# Patient Record
Sex: FEMALE | Race: WHITE | NOT HISPANIC OR LATINO | Employment: PART TIME | URBAN - METROPOLITAN AREA
[De-identification: names, ages, dates, MRNs, and addresses within clinical notes are randomized per-mention and may not be internally consistent; named-entity substitution may affect disease eponyms.]

---

## 2017-09-07 ENCOUNTER — HOSPITAL ENCOUNTER (EMERGENCY)
Facility: OTHER | Age: 57
Discharge: HOME | End: 2017-09-07
Attending: EMERGENCY MEDICINE
Payer: MEDICAID

## 2017-09-07 VITALS
DIASTOLIC BLOOD PRESSURE: 83 MMHG | TEMPERATURE: 98 F | HEART RATE: 85 BPM | BODY MASS INDEX: 37.73 KG/M2 | OXYGEN SATURATION: 95 % | SYSTOLIC BLOOD PRESSURE: 152 MMHG | RESPIRATION RATE: 20 BRPM | HEIGHT: 62 IN | WEIGHT: 205 LBS

## 2017-09-07 DIAGNOSIS — J45.901 ASTHMA ATTACK: Primary | ICD-10-CM

## 2017-09-07 DIAGNOSIS — Z72.0 TOBACCO ABUSE: ICD-10-CM

## 2017-09-07 LAB
CTP QC/QA: YES
CTP QC/QA: YES
FLUAV AG NPH QL: NEGATIVE
FLUBV AG NPH QL: NEGATIVE
S PYO RRNA THROAT QL PROBE: NEGATIVE

## 2017-09-07 PROCEDURE — 87804 INFLUENZA ASSAY W/OPTIC: CPT

## 2017-09-07 PROCEDURE — 94644 CONT INHLJ TX 1ST HOUR: CPT

## 2017-09-07 PROCEDURE — 87880 STREP A ASSAY W/OPTIC: CPT

## 2017-09-07 PROCEDURE — 27100107 HC POCKET PEAK FLOW METER

## 2017-09-07 PROCEDURE — 99284 EMERGENCY DEPT VISIT MOD MDM: CPT | Mod: 25

## 2017-09-07 PROCEDURE — 25000003 PHARM REV CODE 250: Performed by: EMERGENCY MEDICINE

## 2017-09-07 PROCEDURE — 25000242 PHARM REV CODE 250 ALT 637 W/ HCPCS: Performed by: EMERGENCY MEDICINE

## 2017-09-07 PROCEDURE — 96372 THER/PROPH/DIAG INJ SC/IM: CPT

## 2017-09-07 PROCEDURE — 99900035 HC TECH TIME PER 15 MIN (STAT)

## 2017-09-07 PROCEDURE — 94640 AIRWAY INHALATION TREATMENT: CPT

## 2017-09-07 PROCEDURE — 63600175 PHARM REV CODE 636 W HCPCS: Performed by: EMERGENCY MEDICINE

## 2017-09-07 RX ORDER — METHYLPREDNISOLONE SOD SUCC 125 MG
125 VIAL (EA) INJECTION
Status: COMPLETED | OUTPATIENT
Start: 2017-09-07 | End: 2017-09-07

## 2017-09-07 RX ORDER — FLUTICASONE PROPIONATE 50 MCG
2 SPRAY, SUSPENSION (ML) NASAL 2 TIMES DAILY PRN
Qty: 15 G | Refills: 0 | Status: SHIPPED | OUTPATIENT
Start: 2017-09-07 | End: 2017-09-28

## 2017-09-07 RX ORDER — PREDNISONE 20 MG/1
40 TABLET ORAL DAILY
Qty: 10 TABLET | Refills: 0 | Status: SHIPPED | OUTPATIENT
Start: 2017-09-07 | End: 2017-09-12

## 2017-09-07 RX ORDER — IBUPROFEN 600 MG/1
600 TABLET ORAL
Status: COMPLETED | OUTPATIENT
Start: 2017-09-07 | End: 2017-09-07

## 2017-09-07 RX ORDER — AZITHROMYCIN 250 MG/1
TABLET, FILM COATED ORAL
Qty: 6 TABLET | Refills: 0 | Status: SHIPPED | OUTPATIENT
Start: 2017-09-07 | End: 2017-09-12

## 2017-09-07 RX ORDER — IPRATROPIUM BROMIDE AND ALBUTEROL SULFATE 2.5; .5 MG/3ML; MG/3ML
3 SOLUTION RESPIRATORY (INHALATION) ONCE
Status: COMPLETED | OUTPATIENT
Start: 2017-09-07 | End: 2017-09-07

## 2017-09-07 RX ORDER — IBUPROFEN 600 MG/1
600 TABLET ORAL EVERY 6 HOURS PRN
Qty: 20 TABLET | Refills: 0 | Status: SHIPPED | OUTPATIENT
Start: 2017-09-07 | End: 2017-12-24 | Stop reason: ALTCHOICE

## 2017-09-07 RX ORDER — LISINOPRIL 40 MG/1
40 TABLET ORAL DAILY
COMMUNITY

## 2017-09-07 RX ORDER — IPRATROPIUM BROMIDE 0.5 MG/2.5ML
500 SOLUTION RESPIRATORY (INHALATION)
Status: COMPLETED | OUTPATIENT
Start: 2017-09-07 | End: 2017-09-07

## 2017-09-07 RX ORDER — ALBUTEROL SULFATE 90 UG/1
1-2 AEROSOL, METERED RESPIRATORY (INHALATION) EVERY 6 HOURS PRN
Qty: 1 INHALER | Refills: 0 | Status: SHIPPED | OUTPATIENT
Start: 2017-09-07 | End: 2018-09-07

## 2017-09-07 RX ORDER — ALBUTEROL SULFATE 0.83 MG/ML
10 SOLUTION RESPIRATORY (INHALATION)
Status: COMPLETED | OUTPATIENT
Start: 2017-09-07 | End: 2017-09-07

## 2017-09-07 RX ADMIN — IPRATROPIUM BROMIDE AND ALBUTEROL SULFATE 3 ML: .5; 3 SOLUTION RESPIRATORY (INHALATION) at 12:09

## 2017-09-07 RX ADMIN — ALBUTEROL SULFATE 10 MG: 2.5 SOLUTION RESPIRATORY (INHALATION) at 01:09

## 2017-09-07 RX ADMIN — IPRATROPIUM BROMIDE 500 MCG: 0.5 SOLUTION RESPIRATORY (INHALATION) at 01:09

## 2017-09-07 RX ADMIN — METHYLPREDNISOLONE SODIUM SUCCINATE 125 MG: 125 INJECTION, POWDER, FOR SOLUTION INTRAMUSCULAR; INTRAVENOUS at 01:09

## 2017-09-07 RX ADMIN — IBUPROFEN 600 MG: 600 TABLET, FILM COATED ORAL at 12:09

## 2017-09-07 NOTE — ED PROVIDER NOTES
Encounter Date: 9/7/2017       History     Chief Complaint   Patient presents with    Sore Throat     patient reports having URI symptoms X2 days    Fever    Cough     Antionette Hess is a 56 y.o. female who presents to the Emergency Department with  runny nose, cough, congestion, and wheezing.  Patient states she started having sore throat, fever and cough 2 days ago.  Patient had a fever last night which she checked of 102.1.  Patient woke up wheezing this morning.  Patient does report a history of asthma.  Patient states she hasn't had an asthma attack a long time but just moved back to [place].  Patient also smokes cigarettes.  Patient states she smokes half pack a day.      The history is provided by the patient.   Cough   This is a new problem. The current episode started yesterday. The problem occurs every few minutes. The problem has been gradually worsening. The cough is productive of sputum. The maximum temperature recorded prior to her arrival was 102 - 102.9 F. The fever has been present for less than 1 day. Associated symptoms include chills, rhinorrhea, sore throat, myalgias and wheezing. Pertinent negatives include no chest pain and no shortness of breath. She has tried nothing for the symptoms. The treatment provided no relief. Her past medical history is significant for asthma.     Review of patient's allergies indicates:   Allergen Reactions    Iodine and iodide containing products      Past Medical History:   Diagnosis Date    Asthma     Hypertension      History reviewed. No pertinent surgical history.  History reviewed. No pertinent family history.  Social History   Substance Use Topics    Smoking status: Current Every Day Smoker     Packs/day: 0.50    Smokeless tobacco: Never Used    Alcohol use No     Review of Systems   Constitutional: Positive for chills. Negative for fever.   HENT: Positive for congestion, rhinorrhea and sore throat.    Respiratory: Positive for cough and  wheezing. Negative for shortness of breath.    Cardiovascular: Negative for chest pain.   Gastrointestinal: Negative for nausea.   Genitourinary: Negative for dysuria.   Musculoskeletal: Positive for myalgias. Negative for back pain.   Skin: Negative for rash.   Neurological: Negative for weakness.   Hematological: Does not bruise/bleed easily.   All other systems reviewed and are negative.      Physical Exam     Initial Vitals [09/07/17 1151]   BP Pulse Resp Temp SpO2   (!) 180/89 86 20 97.5 °F (36.4 °C) 97 %      MAP       119.33         Physical Exam    Nursing note and vitals reviewed.  Constitutional: She appears well-developed and well-nourished. She is not diaphoretic. No distress.   HENT:   Head: Normocephalic and atraumatic.   Right Ear: Hearing, tympanic membrane and external ear normal.   Left Ear: Hearing, tympanic membrane and external ear normal.   Nose: Mucosal edema and rhinorrhea present. Right sinus exhibits no maxillary sinus tenderness and no frontal sinus tenderness. Left sinus exhibits no maxillary sinus tenderness and no frontal sinus tenderness.   Mouth/Throat: Uvula is midline and mucous membranes are normal. Posterior oropharyngeal erythema present. No oropharyngeal exudate or posterior oropharyngeal edema.   Eyes: Conjunctivae and EOM are normal. Pupils are equal, round, and reactive to light. Right eye exhibits no discharge. Left eye exhibits no discharge.   Neck: Normal range of motion. Neck supple. No JVD present.   Cardiovascular: Normal rate, regular rhythm, normal heart sounds and intact distal pulses. Exam reveals no gallop and no friction rub.    No murmur heard.  Pulmonary/Chest: No respiratory distress. She has wheezes. She has no rhonchi. She has no rales. She exhibits no tenderness.   Abdominal: Soft. Bowel sounds are normal. She exhibits no distension. There is no tenderness. There is no rebound.   Musculoskeletal: Normal range of motion. She exhibits no edema or tenderness.    Lymphadenopathy:     She has no cervical adenopathy.   Neurological: She is alert and oriented to person, place, and time. She has normal strength and normal reflexes. She displays normal reflexes. No cranial nerve deficit or sensory deficit.   Skin: Skin is warm and dry. No rash and no abscess noted. No erythema. No pallor.   Psychiatric: She has a normal mood and affect.         ED Course   Procedures  Labs Reviewed   POCT RAPID STREP A   POCT INFLUENZA A/B                               ED Course        Medical decision making   Chief complaint: Nose, cough, sore throat, fever, and wheezing  Differential diagnosis: And diverticulitis, streptococcal pharyngitis, Influenza, viral illness, URI, asthma exacerbation, bronchitis, pneumonia  Treatment in the ED Physical Exam, ibuprofen for pain, DuoNeb's for wheezing.  Patient reports feeling better after medication.    Discussed labs, and imaging results.    Fill and take prescriptions as directed.  Return to the ED if symptoms worsen or do not resolve.   Answered questions and discussed discharge plan.    Patient feels much better and is ready for discharge.  Follow up with PCP in 1 days.    Clinical Impression:   The primary encounter diagnosis was Asthma attack. A diagnosis of Tobacco abuse was also pertinent to this visit.                           Heydi Joyce DO  09/07/17 7490

## 2017-09-07 NOTE — ED NOTES
Pt does not sound any better, still with audible wheezing, reports she doesn't feel any better, Dr Joyce notified, orders for hour long RT and steroids given

## 2017-09-19 ENCOUNTER — HOSPITAL ENCOUNTER (EMERGENCY)
Facility: OTHER | Age: 57
Discharge: HOME | End: 2017-09-19
Attending: EMERGENCY MEDICINE
Payer: MEDICAID

## 2017-09-19 VITALS
OXYGEN SATURATION: 100 % | HEART RATE: 67 BPM | DIASTOLIC BLOOD PRESSURE: 87 MMHG | BODY MASS INDEX: 38.09 KG/M2 | SYSTOLIC BLOOD PRESSURE: 132 MMHG | RESPIRATION RATE: 18 BRPM | WEIGHT: 207 LBS | HEIGHT: 62 IN | TEMPERATURE: 98 F

## 2017-09-19 DIAGNOSIS — G44.209 ACUTE NON INTRACTABLE TENSION-TYPE HEADACHE: Primary | ICD-10-CM

## 2017-09-19 LAB — POCT GLUCOSE: 82 MG/DL (ref 70–110)

## 2017-09-19 PROCEDURE — 99284 EMERGENCY DEPT VISIT MOD MDM: CPT | Mod: 25

## 2017-09-19 PROCEDURE — 63600175 PHARM REV CODE 636 W HCPCS: Performed by: EMERGENCY MEDICINE

## 2017-09-19 PROCEDURE — 96372 THER/PROPH/DIAG INJ SC/IM: CPT

## 2017-09-19 RX ORDER — BUTALBITAL, ACETAMINOPHEN AND CAFFEINE 50; 325; 40 MG/1; MG/1; MG/1
1 TABLET ORAL EVERY 4 HOURS PRN
Qty: 12 TABLET | Refills: 0 | Status: SHIPPED | OUTPATIENT
Start: 2017-09-19 | End: 2017-10-19

## 2017-09-19 RX ORDER — KETOROLAC TROMETHAMINE 30 MG/ML
60 INJECTION, SOLUTION INTRAMUSCULAR; INTRAVENOUS
Status: COMPLETED | OUTPATIENT
Start: 2017-09-19 | End: 2017-09-19

## 2017-09-19 RX ORDER — DIPHENHYDRAMINE HYDROCHLORIDE 50 MG/ML
50 INJECTION INTRAMUSCULAR; INTRAVENOUS
Status: COMPLETED | OUTPATIENT
Start: 2017-09-19 | End: 2017-09-19

## 2017-09-19 RX ORDER — PROCHLORPERAZINE EDISYLATE 5 MG/ML
10 INJECTION INTRAMUSCULAR; INTRAVENOUS
Status: COMPLETED | OUTPATIENT
Start: 2017-09-19 | End: 2017-09-19

## 2017-09-19 RX ADMIN — PROCHLORPERAZINE EDISYLATE 10 MG: 5 INJECTION INTRAMUSCULAR; INTRAVENOUS at 06:09

## 2017-09-19 RX ADMIN — DIPHENHYDRAMINE HYDROCHLORIDE 50 MG: 50 INJECTION, SOLUTION INTRAMUSCULAR; INTRAVENOUS at 06:09

## 2017-09-19 RX ADMIN — KETOROLAC TROMETHAMINE 60 MG: 60 INJECTION, SOLUTION INTRAMUSCULAR at 06:09

## 2017-12-24 ENCOUNTER — HOSPITAL ENCOUNTER (EMERGENCY)
Facility: OTHER | Age: 57
Discharge: HOME | End: 2017-12-25
Attending: EMERGENCY MEDICINE
Payer: MEDICAID

## 2017-12-24 VITALS
HEART RATE: 62 BPM | RESPIRATION RATE: 16 BRPM | TEMPERATURE: 99 F | OXYGEN SATURATION: 98 % | SYSTOLIC BLOOD PRESSURE: 144 MMHG | BODY MASS INDEX: 40.24 KG/M2 | WEIGHT: 220 LBS | DIASTOLIC BLOOD PRESSURE: 57 MMHG

## 2017-12-24 DIAGNOSIS — M25.511 ACUTE PAIN OF RIGHT SHOULDER: Primary | ICD-10-CM

## 2017-12-24 PROCEDURE — 99283 EMERGENCY DEPT VISIT LOW MDM: CPT | Mod: 25

## 2017-12-24 PROCEDURE — 96372 THER/PROPH/DIAG INJ SC/IM: CPT

## 2017-12-24 RX ORDER — KETOROLAC TROMETHAMINE 30 MG/ML
60 INJECTION, SOLUTION INTRAMUSCULAR; INTRAVENOUS
Status: COMPLETED | OUTPATIENT
Start: 2017-12-25 | End: 2017-12-25

## 2017-12-24 RX ORDER — IBUPROFEN 800 MG/1
800 TABLET ORAL EVERY 6 HOURS PRN
Qty: 20 TABLET | Refills: 0 | Status: SHIPPED | OUTPATIENT
Start: 2017-12-24 | End: 2018-07-04 | Stop reason: ALTCHOICE

## 2017-12-24 RX ORDER — DEXAMETHASONE SODIUM PHOSPHATE 4 MG/ML
8 INJECTION, SOLUTION INTRA-ARTICULAR; INTRALESIONAL; INTRAMUSCULAR; INTRAVENOUS; SOFT TISSUE
Status: COMPLETED | OUTPATIENT
Start: 2017-12-25 | End: 2017-12-25

## 2017-12-24 RX ORDER — DICLOFENAC SODIUM 10 MG/G
2 GEL TOPICAL 3 TIMES DAILY
Qty: 100 G | Refills: 0 | OUTPATIENT
Start: 2017-12-24 | End: 2020-10-31

## 2017-12-25 PROCEDURE — 63600175 PHARM REV CODE 636 W HCPCS: Performed by: EMERGENCY MEDICINE

## 2017-12-25 RX ADMIN — DEXAMETHASONE SODIUM PHOSPHATE 8 MG: 4 INJECTION, SOLUTION INTRAMUSCULAR; INTRAVENOUS at 12:12

## 2017-12-25 RX ADMIN — KETOROLAC TROMETHAMINE 60 MG: 60 INJECTION, SOLUTION INTRAMUSCULAR at 12:12

## 2017-12-25 NOTE — ED PROVIDER NOTES
Encounter Date: 12/24/2017       History     Chief Complaint   Patient presents with    Arm Injury     reports pain to her rt arm for more than a week, states she cannot lift her arm up, denies trauma      Denies traumatic injury.          Arm Injury    The incident occurred two days ago. 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 The injury mechanism is unknown. She came to the ER via by private vehicle. There is an injury to the right shoulder. There have been no prior injuries to these areas. She is right-handed. Her tetanus status is UTD. Pertinent negatives include no chest pain, no numbness, no nausea, no vomiting, no inability to bear weight, no neck pain, no pain when bearing weight, no focal weakness, no decreased responsiveness, no loss of consciousness, no tingling and no weakness.     Review of patient's allergies indicates:   Allergen Reactions    Iodine and iodide containing products      Past Medical History:   Diagnosis Date    Asthma     Hypertension      No past surgical history on file.  No family history on file.  Social History   Substance Use Topics    Smoking status: Current Every Day Smoker     Packs/day: 0.50    Smokeless tobacco: Never Used    Alcohol use No     Review of Systems   Constitutional: Negative for decreased responsiveness and fever.   Respiratory: Negative for shortness of breath.    Cardiovascular: Negative for chest pain.   Gastrointestinal: Negative for nausea and vomiting.   Musculoskeletal: Positive for arthralgias. Negative for back pain, gait problem, joint swelling, myalgias, neck pain and neck stiffness.   Neurological: Negative for tingling, focal weakness, loss of consciousness, weakness and numbness.   All other systems reviewed and are negative.      Physical Exam     Initial Vitals [12/24/17 2205]   BP Pulse Resp Temp SpO2   (!) 144/57 62 16 98.6 °F (37 °C) 98 %      MAP       86         Physical Exam    Nursing note and vitals  reviewed.  Constitutional: She appears well-developed and well-nourished. She is not diaphoretic. No distress.   HENT:   Head: Normocephalic and atraumatic.   Eyes: Conjunctivae are normal. Pupils are equal, round, and reactive to light.   Neck: Normal range of motion. Neck supple.   Cardiovascular: Normal rate and intact distal pulses.   Pulmonary/Chest: No accessory muscle usage. No tachypnea. No respiratory distress.   Abdominal: She exhibits no distension. There is no tenderness.   Musculoskeletal: Normal range of motion.        Right shoulder: She exhibits tenderness. She exhibits normal range of motion (ROM reproduces pain greater than 60 degrees of flexion), no bony tenderness, no swelling, no effusion, no crepitus, no deformity, no laceration, no spasm, normal pulse and normal strength.        Right elbow: Normal.       Right wrist: Normal.        Cervical back: Normal.   Neurological: She is alert and oriented to person, place, and time.   Skin: Skin is warm. Capillary refill takes less than 2 seconds.   Psychiatric: She has a normal mood and affect.         ED Course   Procedures  Labs Reviewed - No data to display          Medical Decision Making:   Differential Diagnosis:   Sprain, spasm, arthritis, others                   ED Course      Discharge Medications     Medication List with Changes/Refills   New Medications    DICLOFENAC SODIUM (VOLTAREN) 1 % GEL    Apply 2 g topically 3 (three) times daily.    IBUPROFEN (ADVIL,MOTRIN) 800 MG TABLET    Take 1 tablet (800 mg total) by mouth every 6 (six) hours as needed for Pain.   Current Medications    ALBUTEROL 90 MCG/ACTUATION INHALER    Inhale 1-2 puffs into the lungs every 6 (six) hours as needed for Wheezing. Rescue    CETIRIZINE (ZYRTEC) 10 MG TABLET    Take 1 tablet (10 mg total) by mouth once daily. May buy Over the Counter.    LISINOPRIL (PRINIVIL,ZESTRIL) 40 MG TABLET    Take 40 mg by mouth once daily.   Discontinued Medications    IBUPROFEN  (ADVIL,MOTRIN) 600 MG TABLET    Take 1 tablet (600 mg total) by mouth every 6 (six) hours as needed for Pain or Temperature greater than (100.4).             Patient discharged to home in stable condition with instructions to:   1. Please take all meds as prescribed.  2. Follow-up with your primary care doctor   3. Return precautions discussed and patient and/or family/caretaker understands to return to the emergency room for any concerns including worsening of your current symptoms, fever, chills, night sweats, worsening pain, chest pain, shortness of breath, nausea, vomiting, diarrhea, bleeding, headache, difficulty talking, visual disturbances, weakness, numbness or any other acute concerns    Clinical Impression:   The encounter diagnosis was Acute pain of right shoulder.                           Gerard Adams MD  01/22/18 0614

## 2017-12-25 NOTE — ED NOTES
Pt here with c/o right arm pain x 1 week. Pt denies injury, states that it hurts to use her right arm, unable to raise above shoulder height. Pt states that when she tries to move, it feels like something is pulling at her right shoulder. Pt alert and oriented, no meds pta. States she tried an icy hot patch with no relief. Resps reg and unlabored, skin warm and dry, no distress noted.

## 2017-12-25 NOTE — ED NOTES
Sling applied to pt's right arm, and instructions for wearing sling provided. Pt verbalizes understanding

## 2018-07-03 ENCOUNTER — HOSPITAL ENCOUNTER (EMERGENCY)
Facility: HOSPITAL | Age: 58
Discharge: HOME | End: 2018-07-04
Attending: EMERGENCY MEDICINE
Payer: MEDICAID

## 2018-07-03 VITALS
DIASTOLIC BLOOD PRESSURE: 73 MMHG | SYSTOLIC BLOOD PRESSURE: 145 MMHG | HEART RATE: 73 BPM | OXYGEN SATURATION: 99 % | TEMPERATURE: 98 F | RESPIRATION RATE: 18 BRPM | BODY MASS INDEX: 35.88 KG/M2 | WEIGHT: 195 LBS | HEIGHT: 62 IN

## 2018-07-03 DIAGNOSIS — S60.00XA CONTUSION OF FINGER OF RIGHT HAND, UNSPECIFIED FINGER, INITIAL ENCOUNTER: Primary | ICD-10-CM

## 2018-07-03 DIAGNOSIS — H60.12 CELLULITIS OF EXTERNAL EAR, LEFT: ICD-10-CM

## 2018-07-03 PROCEDURE — 29130 APPL FINGER SPLINT STATIC: CPT | Mod: F8

## 2018-07-03 PROCEDURE — 99283 EMERGENCY DEPT VISIT LOW MDM: CPT | Mod: 25

## 2018-07-04 RX ORDER — IBUPROFEN 800 MG/1
800 TABLET ORAL EVERY 6 HOURS PRN
Qty: 20 TABLET | Refills: 0 | OUTPATIENT
Start: 2018-07-04 | End: 2019-08-15

## 2018-07-04 RX ORDER — MUPIROCIN 20 MG/G
OINTMENT TOPICAL 2 TIMES DAILY
Qty: 22 G | Refills: 0 | Status: SHIPPED | OUTPATIENT
Start: 2018-07-04 | End: 2018-07-14

## 2018-07-04 RX ORDER — SULFAMETHOXAZOLE AND TRIMETHOPRIM 800; 160 MG/1; MG/1
1 TABLET ORAL 2 TIMES DAILY
Qty: 14 TABLET | Refills: 0 | Status: SHIPPED | OUTPATIENT
Start: 2018-07-04 | End: 2018-07-11

## 2018-07-04 NOTE — ED PROVIDER NOTES
Encounter Date: 7/3/2018    SCRIBE #1 NOTE: I, Vahid Bonds, am scribing for, and in the presence of,  Dr. Adams. I have scribed the entire note.       History     Chief Complaint   Patient presents with    Otalgia     pt presents with c/o L, ear pain radiating to L, side of face; (+) swelling, warmth and redness noted to L, ear; denies drainage    Finger Injury     pt presents with c/o injury to R, 4th finger; pt reports smashing finger in a drawer recently; swelling noted to to finger; reports limited ROM     The patient is a 57 y.o. female who presents to the ED with a complaint of swelling and tenderness of the left ear with pain that radiates throughout her left side of the face down and her neck .   Patient states that she went to work for 10 hours and her boss noticed the swelling of her ear.  She reports secondary complaint of acute finger injury after pinching the skin of her finger in a drawer 1 day ago.  After she went to bed she woke up with swelling to her right hand that has gotten worse after working all day.         The history is provided by the patient.     Review of patient's allergies indicates:   Allergen Reactions    Iodine and iodide containing products      Past Medical History:   Diagnosis Date    Asthma     Hypertension      No past surgical history on file.  No family history on file.  Social History   Substance Use Topics    Smoking status: Current Every Day Smoker     Packs/day: 0.50    Smokeless tobacco: Never Used    Alcohol use No     Review of Systems   Constitutional: Negative for fever.   HENT: Positive for ear pain (Left ear pain. ). Negative for ear discharge, facial swelling and trouble swallowing.         Negative tongue swelling. Swelling and tenderness of the left ear.    Respiratory: Negative for shortness of breath.    Gastrointestinal: Negative for nausea and vomiting.   Musculoskeletal: Negative for neck pain.   Skin: Positive for wound (Right hand).   All other  systems reviewed and are negative.      Physical Exam     Initial Vitals [07/03/18 2239]   BP Pulse Resp Temp SpO2   (!) 145/73 73 18 98.2 °F (36.8 °C) 99 %      MAP       --         Physical Exam    Nursing note and vitals reviewed.  Constitutional: She appears well-developed and well-nourished.   HENT:   Head: Normocephalic and atraumatic.   Swelling, erythema, and edema to the left external ear.     Neck: Normal range of motion. Neck supple.   Musculoskeletal: Normal range of motion.        Hands:  Proximal Phalanx swelling.     Neurological: She is alert and oriented to person, place, and time.   Sensation intact.      Skin: Skin is warm, dry and intact. Capillary refill takes less than 2 seconds.   Psychiatric: She has a normal mood and affect.         ED Course   Procedures  Labs Reviewed - No data to display       Imaging Results    None                     Scribe Attestation:   Scribe #1: I performed the above scribed service and the documentation accurately describes the services I performed. I attest to the accuracy of the note.         This document was produced by a scribe under my direction and in my presence. I agree with the content of the note and have made any necessary edits.     Gerard Adams MD         Note was created using voice recognition software. Note may have occasional typographical errors that may not have been identified and edited despite good xander initial review prior to signing.           Discharge Medications     Discharge Medication List as of 7/4/2018 12:27 AM      START taking these medications    Details   ibuprofen (ADVIL,MOTRIN) 800 MG tablet Take 1 tablet (800 mg total) by mouth every 6 (six) hours as needed for Pain., Starting Wed 7/4/2018, Print      mupirocin (BACTROBAN) 2 % ointment Apply topically 2 (two) times daily. for 10 days, Starting Wed 7/4/2018, Until Sat 7/14/2018, Print      sulfamethoxazole-trimethoprim 800-160mg (BACTRIM DS) 800-160 mg Tab Take 1 tablet by  mouth 2 (two) times daily. for 7 days, Starting Wed 7/4/2018, Until Wed 7/11/2018, Print         CONTINUE these medications which have NOT CHANGED    Details   albuterol 90 mcg/actuation inhaler Inhale 1-2 puffs into the lungs every 6 (six) hours as needed for Wheezing. Rescue, Starting Thu 9/7/2017, Until Fri 9/7/2018, Print      cetirizine (ZYRTEC) 10 MG tablet Take 1 tablet (10 mg total) by mouth once daily. May buy Over the Counter., Starting Mon 12/23/2013, Until Tue 9/19/2017, Print      diclofenac sodium (VOLTAREN) 1 % Gel Apply 2 g topically 3 (three) times daily., Starting Sun 12/24/2017, Print      lisinopril (PRINIVIL,ZESTRIL) 40 MG tablet Take 40 mg by mouth once daily., Historical Med                   Patient discharged to home in stable condition with instructions to:   1. Please take all meds as prescribed.  2. Follow-up with your primary care doctor   3. Return precautions discussed and patient and/or family/caretaker understands to return to the emergency room for any concerns including worsening of your current symptoms, fever, chills, night sweats, worsening pain, chest pain, shortness of breath, nausea, vomiting, diarrhea, bleeding, headache, difficulty talking, visual disturbances, weakness, numbness or any other acute concerns       Clinical Impression:     1. Contusion of finger of right hand, unspecified finger, initial encounter    2. Cellulitis of external ear, left                                   Gerard Adams MD  07/24/18 0107

## 2019-01-28 ENCOUNTER — HOSPITAL ENCOUNTER (EMERGENCY)
Facility: HOSPITAL | Age: 59
Discharge: HOME | End: 2019-01-28
Attending: EMERGENCY MEDICINE
Payer: MEDICAID

## 2019-01-28 VITALS
TEMPERATURE: 98 F | HEART RATE: 89 BPM | RESPIRATION RATE: 16 BRPM | HEIGHT: 63 IN | BODY MASS INDEX: 35.44 KG/M2 | DIASTOLIC BLOOD PRESSURE: 76 MMHG | SYSTOLIC BLOOD PRESSURE: 178 MMHG | WEIGHT: 200 LBS | OXYGEN SATURATION: 98 %

## 2019-01-28 DIAGNOSIS — J45.21 MILD INTERMITTENT ASTHMA WITH EXACERBATION: Primary | ICD-10-CM

## 2019-01-28 PROCEDURE — 94644 CONT INHLJ TX 1ST HOUR: CPT | Mod: ER

## 2019-01-28 PROCEDURE — 63600175 PHARM REV CODE 636 W HCPCS: Mod: ER | Performed by: PHYSICIAN ASSISTANT

## 2019-01-28 PROCEDURE — 25000242 PHARM REV CODE 250 ALT 637 W/ HCPCS: Mod: ER | Performed by: PHYSICIAN ASSISTANT

## 2019-01-28 PROCEDURE — 99284 EMERGENCY DEPT VISIT MOD MDM: CPT | Mod: 25,ER

## 2019-01-28 PROCEDURE — 96374 THER/PROPH/DIAG INJ IV PUSH: CPT | Mod: ER

## 2019-01-28 RX ORDER — ALBUTEROL SULFATE 90 UG/1
1-2 AEROSOL, METERED RESPIRATORY (INHALATION) EVERY 6 HOURS PRN
Qty: 1 INHALER | Refills: 1 | Status: SHIPPED | OUTPATIENT
Start: 2019-01-28 | End: 2019-05-27 | Stop reason: SDUPTHER

## 2019-01-28 RX ORDER — IPRATROPIUM BROMIDE 0.5 MG/2.5ML
500 SOLUTION RESPIRATORY (INHALATION)
Status: COMPLETED | OUTPATIENT
Start: 2019-01-28 | End: 2019-01-28

## 2019-01-28 RX ORDER — OSELTAMIVIR PHOSPHATE 75 MG/1
75 CAPSULE ORAL 2 TIMES DAILY
Qty: 10 CAPSULE | Refills: 0 | Status: SHIPPED | OUTPATIENT
Start: 2019-01-28 | End: 2019-02-02

## 2019-01-28 RX ORDER — METHYLPREDNISOLONE SOD SUCC 125 MG
125 VIAL (EA) INJECTION
Status: COMPLETED | OUTPATIENT
Start: 2019-01-28 | End: 2019-01-28

## 2019-01-28 RX ORDER — PREDNISONE 50 MG/1
50 TABLET ORAL DAILY
Qty: 5 TABLET | Refills: 0 | Status: SHIPPED | OUTPATIENT
Start: 2019-01-28 | End: 2019-02-02

## 2019-01-28 RX ORDER — ALBUTEROL SULFATE 2.5 MG/.5ML
10 SOLUTION RESPIRATORY (INHALATION)
Status: COMPLETED | OUTPATIENT
Start: 2019-01-28 | End: 2019-01-28

## 2019-01-28 RX ORDER — CETIRIZINE HYDROCHLORIDE 10 MG/1
10 TABLET ORAL DAILY
Qty: 30 TABLET | Refills: 0 | Status: SHIPPED | OUTPATIENT
Start: 2019-01-28 | End: 2020-01-28

## 2019-01-28 RX ORDER — IPRATROPIUM BROMIDE AND ALBUTEROL SULFATE 2.5; .5 MG/3ML; MG/3ML
3 SOLUTION RESPIRATORY (INHALATION) EVERY 6 HOURS PRN
Qty: 1 BOX | Refills: 0 | Status: SHIPPED | OUTPATIENT
Start: 2019-01-28 | End: 2019-05-27 | Stop reason: SDUPTHER

## 2019-01-28 RX ADMIN — METHYLPREDNISOLONE SODIUM SUCCINATE 125 MG: 125 INJECTION, POWDER, FOR SOLUTION INTRAMUSCULAR; INTRAVENOUS at 01:01

## 2019-01-28 RX ADMIN — IPRATROPIUM BROMIDE 500 MCG: 0.5 SOLUTION RESPIRATORY (INHALATION) at 01:01

## 2019-01-28 RX ADMIN — ALBUTEROL SULFATE 10 MG: 2.5 SOLUTION RESPIRATORY (INHALATION) at 01:01

## 2019-01-28 NOTE — ED PROVIDER NOTES
Encounter Date: 1/28/2019       History     Chief Complaint   Patient presents with    Wheezing     patient reports SOB, wheezing and sore throat started today about 4 am    Shortness of Breath    Sore Throat     57yo F with pmh asthma, HTN, with chief complaint acute onset wheeze, nonproductive cough, SOB. Pt states she began with nonproductive cough yesterday. Early this AM she woke with wheeze and SOB. She typically experiences asthma exacerbations once every 2 years. She denies frequent nighttime awakenings. She denies use of inhaler more than once monthly. She states this presentation feels like her previous asthma exacerbations. She denies CP. She denies recent travel. She denies hx DVT/PE. No calf pain or unilateral leg swelling. No pretibial edema or hx CHF. No alleviating/exacerbating factors. No radiation of symptoms. Symptoms are acute, constant, severity 8/10. She denies fever.          Review of patient's allergies indicates:   Allergen Reactions    Iodine and iodide containing products      Past Medical History:   Diagnosis Date    Asthma     Hypertension      History reviewed. No pertinent surgical history.  History reviewed. No pertinent family history.  Social History     Tobacco Use    Smoking status: Current Every Day Smoker     Packs/day: 0.50    Smokeless tobacco: Never Used   Substance Use Topics    Alcohol use: No    Drug use: No     Review of Systems   Constitutional: Negative for chills and fever.   HENT: Positive for sore throat. Negative for congestion and rhinorrhea.    Eyes: Negative.    Respiratory: Positive for cough, chest tightness, shortness of breath and wheezing.    Cardiovascular: Negative for chest pain, palpitations and leg swelling.   Gastrointestinal: Negative for abdominal pain, nausea and vomiting.   Endocrine: Negative.    Genitourinary: Negative for dysuria.   Musculoskeletal: Negative for back pain, myalgias, neck pain and neck stiffness.   Skin: Negative for  rash.   Neurological: Negative for dizziness, weakness and headaches.   Hematological: Does not bruise/bleed easily.   Psychiatric/Behavioral: Negative.    All other systems reviewed and are negative.      Physical Exam     Initial Vitals [01/28/19 1301]   BP Pulse Resp Temp SpO2   (!) 189/78 108 (!) 22 97.9 °F (36.6 °C) 96 %      MAP       --         Physical Exam    Nursing note and vitals reviewed.  Constitutional: She appears well-developed and well-nourished. She is not diaphoretic. No distress.   Overall well-appearing, nontoxic. Speaking in full sentences without pause. Resting comfortably on exam table.    HENT:   Head: Normocephalic and atraumatic.   Mouth/Throat: Oropharynx is clear and moist.   Eyes: Conjunctivae and EOM are normal. Pupils are equal, round, and reactive to light.   Neck: Normal range of motion. Neck supple. No tracheal deviation present.   Cardiovascular: Intact distal pulses.   No pretibial edema.    Pulmonary/Chest: No stridor.   Inspir/Expir wheeze bilaterally. Decreased BS bilateral bases. No rhonchi. Tachypnea. No hypoxia on room air. No accessory muscle use. No nasal flaring.   Abdominal: Soft. Bowel sounds are normal. She exhibits no distension. There is no tenderness.   Musculoskeletal: Normal range of motion. She exhibits no tenderness.   Lymphadenopathy:     She has no cervical adenopathy.   Neurological: She is alert and oriented to person, place, and time.   Skin: Skin is warm and dry. Capillary refill takes less than 2 seconds.   Psychiatric: She has a normal mood and affect. Her behavior is normal. Judgment and thought content normal.         ED Course   Procedures  Labs Reviewed - No data to display       Imaging Results    None          Medical Decision Making:   Differential Diagnosis:   Bronchospasm, viral illness, RAD, pnemonia, bronchitis  ED Management:  No acute resp distress. Similar to previous asthma exacerbations. No rhonchi. No fever. Symptoms began this AM. I  do not suspect pneumonia. No history of thrombophilia.  No recent surgery.  No major trauma. No recent immobility.  No active cancer.  No exogenous estrogen. No history of percutaneous indwelling catheter. No previous DVT/PE. Despite age, I do not think this represents VTE. She feels much better after neb treatment. SOB improved. No CP. No signs of acute volume overload. I will treat as bronchospasm due to reactive airway. She will f/u with her PCP at [facility]. I feel she can be safely managed as an outpatient with prompt PCP f/u. Return precautions given.                        Clinical Impression:   The encounter diagnosis was Mild intermittent asthma with exacerbation.      Disposition:   Disposition: Discharged  Condition: Stable                        Tucker Frances PA-C  01/29/19 1132

## 2019-05-27 ENCOUNTER — HOSPITAL ENCOUNTER (EMERGENCY)
Facility: HOSPITAL | Age: 59
Discharge: HOME | End: 2019-05-27
Attending: EMERGENCY MEDICINE
Payer: MEDICAID

## 2019-05-27 VITALS
HEART RATE: 83 BPM | HEIGHT: 62 IN | BODY MASS INDEX: 35.88 KG/M2 | WEIGHT: 195 LBS | RESPIRATION RATE: 20 BRPM | TEMPERATURE: 98 F | OXYGEN SATURATION: 96 % | SYSTOLIC BLOOD PRESSURE: 154 MMHG | DIASTOLIC BLOOD PRESSURE: 82 MMHG

## 2019-05-27 DIAGNOSIS — J20.9 ACUTE BRONCHITIS, UNSPECIFIED ORGANISM: Primary | ICD-10-CM

## 2019-05-27 DIAGNOSIS — R05.9 COUGH: ICD-10-CM

## 2019-05-27 LAB
CTP QC/QA: YES
FLUAV AG NPH QL: NEGATIVE
FLUBV AG NPH QL: NEGATIVE

## 2019-05-27 PROCEDURE — 99284 EMERGENCY DEPT VISIT MOD MDM: CPT | Mod: 25,ER

## 2019-05-27 PROCEDURE — 25000242 PHARM REV CODE 250 ALT 637 W/ HCPCS: Mod: ER | Performed by: PHYSICIAN ASSISTANT

## 2019-05-27 PROCEDURE — 87804 INFLUENZA ASSAY W/OPTIC: CPT | Mod: ER

## 2019-05-27 PROCEDURE — 94644 CONT INHLJ TX 1ST HOUR: CPT | Mod: ER

## 2019-05-27 PROCEDURE — 63600175 PHARM REV CODE 636 W HCPCS: Mod: ER | Performed by: PHYSICIAN ASSISTANT

## 2019-05-27 RX ORDER — AZITHROMYCIN 250 MG/1
250 TABLET, FILM COATED ORAL DAILY
Qty: 6 TABLET | Refills: 0 | Status: SHIPPED | OUTPATIENT
Start: 2019-05-27 | End: 2020-10-31

## 2019-05-27 RX ORDER — PREDNISONE 20 MG/1
60 TABLET ORAL
Status: COMPLETED | OUTPATIENT
Start: 2019-05-27 | End: 2019-05-27

## 2019-05-27 RX ORDER — IPRATROPIUM BROMIDE AND ALBUTEROL SULFATE 2.5; .5 MG/3ML; MG/3ML
3 SOLUTION RESPIRATORY (INHALATION) EVERY 6 HOURS PRN
Qty: 1 BOX | Refills: 0 | Status: SHIPPED | OUTPATIENT
Start: 2019-05-27 | End: 2020-05-26

## 2019-05-27 RX ORDER — ALBUTEROL SULFATE 90 UG/1
1-2 AEROSOL, METERED RESPIRATORY (INHALATION) EVERY 6 HOURS PRN
Qty: 1 INHALER | Refills: 1 | Status: SHIPPED | OUTPATIENT
Start: 2019-05-27 | End: 2020-05-26

## 2019-05-27 RX ORDER — IPRATROPIUM BROMIDE AND ALBUTEROL SULFATE 2.5; .5 MG/3ML; MG/3ML
3 SOLUTION RESPIRATORY (INHALATION)
Status: COMPLETED | OUTPATIENT
Start: 2019-05-27 | End: 2019-05-27

## 2019-05-27 RX ORDER — PREDNISONE 20 MG/1
40 TABLET ORAL DAILY
Qty: 10 TABLET | Refills: 0 | Status: SHIPPED | OUTPATIENT
Start: 2019-05-27 | End: 2019-06-01

## 2019-05-27 RX ADMIN — PREDNISONE 60 MG: 20 TABLET ORAL at 01:05

## 2019-05-27 RX ADMIN — IPRATROPIUM BROMIDE AND ALBUTEROL SULFATE 3 ML: .5; 3 SOLUTION RESPIRATORY (INHALATION) at 01:05

## 2019-05-27 RX ADMIN — IPRATROPIUM BROMIDE AND ALBUTEROL SULFATE 3 ML: .5; 3 SOLUTION RESPIRATORY (INHALATION) at 12:05

## 2019-05-29 NOTE — ED PROVIDER NOTES
Encounter Date: 5/27/2019       History     Chief Complaint   Patient presents with    Cough     pt reports cough and bodyaches that started yesterday with sore throat and fever.     Sore Throat    Otalgia    Fever     57 y/o female with pmhx of asthma presents c/o cough since yesterday. Associated wheezing, subjective fever, and sore throat. PT states she is out of her albuterol inhaler and solution. She denies chest pain, n/v/d.         Review of patient's allergies indicates:   Allergen Reactions    Iodine and iodide containing products      Past Medical History:   Diagnosis Date    Asthma     Hypertension      History reviewed. No pertinent surgical history.  History reviewed. No pertinent family history.  Social History     Tobacco Use    Smoking status: Current Every Day Smoker     Packs/day: 0.50    Smokeless tobacco: Never Used   Substance Use Topics    Alcohol use: No    Drug use: No     Review of Systems   Constitutional: Positive for fever.   HENT: Positive for sore throat.    Eyes: Negative for pain.   Respiratory: Positive for cough and wheezing.    Cardiovascular: Negative for chest pain.   Gastrointestinal: Negative for abdominal pain.   Musculoskeletal: Negative for back pain.   Skin: Negative for color change.   Psychiatric/Behavioral: Negative for behavioral problems.       Physical Exam     Initial Vitals [05/27/19 1225]   BP Pulse Resp Temp SpO2   (!) 176/77 90 19 97.8 °F (36.6 °C) 98 %      MAP       --         Physical Exam    Constitutional: She appears well-developed and well-nourished.   HENT:   Right Ear: External ear normal.   Left Ear: External ear normal.   Mouth/Throat: Oropharynx is clear and moist.   Eyes: Conjunctivae and EOM are normal. Pupils are equal, round, and reactive to light.   Neck: Normal range of motion. Neck supple.   Cardiovascular: Normal rate, regular rhythm and normal heart sounds.   Pulmonary/Chest: She has wheezes.   Abdominal: Soft.   Skin: Skin is  warm.   Psychiatric: She has a normal mood and affect.         ED Course   Procedures  Labs Reviewed   POCT INFLUENZA A/B          Imaging Results          X-Ray Chest PA And Lateral (Final result)  Result time 05/27/19 12:56:14    Final result by Nyla De Anda MD (05/27/19 12:56:14)                 Impression:      No acute abnormality.      Electronically signed by: Nyla De Anda MD  Date:    05/27/2019  Time:    12:56             Narrative:    EXAMINATION:  XR CHEST PA AND LATERAL    CLINICAL HISTORY:  Cough    TECHNIQUE:  PA and lateral views of the chest were performed.    COMPARISON:  None    FINDINGS:  The lungs are clear, with normal appearance of pulmonary vasculature and no pleural effusion or pneumothorax.    The cardiac silhouette is normal in size. The hilar and mediastinal contours are unremarkable.    Bones are intact.                              X-Rays:   Independently Interpreted Readings:   Other Readings:  CXR normal    Medical Decision Making:   Initial Assessment:   The patient appears to have a acute bronchitis.  Flu screen negative. CXR normal. Based upon the patient's history, physical exam, evaluation in the ED, and response to medications I feel the patient is stable for discharge.  I do not think the patient has pneumonia, pneumothorax, or severe exacerbation requiring admission at this time.  The patient will be discharged with a prescription for zpak, albuterol and steroids.                       Clinical Impression:   Acute bronchitis                             HIPOLITO Cuevas  05/29/19 0703

## 2019-08-15 ENCOUNTER — HOSPITAL ENCOUNTER (EMERGENCY)
Facility: HOSPITAL | Age: 59
Discharge: HOME | End: 2019-08-15
Attending: INTERNAL MEDICINE
Payer: MEDICAID

## 2019-08-15 VITALS
HEIGHT: 62 IN | DIASTOLIC BLOOD PRESSURE: 76 MMHG | SYSTOLIC BLOOD PRESSURE: 138 MMHG | OXYGEN SATURATION: 98 % | HEART RATE: 89 BPM | RESPIRATION RATE: 17 BRPM | BODY MASS INDEX: 38.64 KG/M2 | TEMPERATURE: 98 F | WEIGHT: 210 LBS

## 2019-08-15 DIAGNOSIS — S80.01XA CONTUSION OF RIGHT KNEE, INITIAL ENCOUNTER: Primary | ICD-10-CM

## 2019-08-15 DIAGNOSIS — M25.561 RIGHT KNEE PAIN: ICD-10-CM

## 2019-08-15 PROCEDURE — 25000003 PHARM REV CODE 250: Mod: ER | Performed by: INTERNAL MEDICINE

## 2019-08-15 PROCEDURE — 99283 EMERGENCY DEPT VISIT LOW MDM: CPT | Mod: 25,ER

## 2019-08-15 RX ORDER — MELOXICAM 15 MG/1
15 TABLET ORAL DAILY
Qty: 30 TABLET | Refills: 0 | OUTPATIENT
Start: 2019-08-15 | End: 2020-10-31

## 2019-08-15 RX ORDER — ACETAMINOPHEN 500 MG
1000 TABLET ORAL
Status: COMPLETED | OUTPATIENT
Start: 2019-08-15 | End: 2019-08-15

## 2019-08-15 RX ADMIN — ACETAMINOPHEN 1000 MG: 500 TABLET ORAL at 09:08

## 2019-08-16 NOTE — ED PROVIDER NOTES
Encounter Date: 8/15/2019    SCRIBE #1 NOTE: I, Alondra Ramos, am scribing for, and in the presence of,  Dr. Copeland . I have scribed the following portions of the note - Other sections scribed: HPI, ROS, PE.       History     Chief Complaint   Patient presents with    Knee Pain     Right knee pain onset after trip and fall one week ago    Leg Swelling     Swelling to bilateral feet and ankles x2 days     58 y.o female with anxiety and HTN presents to the ED with right knee pain after a trip and fall that occurred 1 week ago. No LOC or head injury. She states she landed directly on her knee. She came to the ED due to having bilateral foot swelling and right knee swelling. She denies eating any salty foods yesterday. Her pain is exacerbated with weight bearing activity.     The history is provided by the patient.     Review of patient's allergies indicates:   Allergen Reactions    Iodine and iodide containing products      Past Medical History:   Diagnosis Date    Anxiety     Asthma     Hypertension      Past Surgical History:   Procedure Laterality Date    HYSTERECTOMY      TUBAL LIGATION       No family history on file.  Social History     Tobacco Use    Smoking status: Current Every Day Smoker     Packs/day: 0.50    Smokeless tobacco: Never Used   Substance Use Topics    Alcohol use: No    Drug use: Yes     Types: Marijuana     Review of Systems   Constitutional: Negative for fever.   HENT: Negative for sore throat.    Respiratory: Negative for shortness of breath.    Cardiovascular: Positive for leg swelling. Negative for chest pain.   Gastrointestinal: Negative for nausea and vomiting.   Genitourinary: Negative for dysuria.   Musculoskeletal: Positive for arthralgias (right knee pain ). Negative for back pain and gait problem.   Skin: Negative for rash.   Neurological: Negative for syncope, weakness and headaches.   Hematological: Negative for adenopathy.   Psychiatric/Behavioral: Negative for  behavioral problems.   All other systems reviewed and are negative.      Physical Exam     Initial Vitals [08/15/19 2045]   BP Pulse Resp Temp SpO2   133/73 92 18 97.7 °F (36.5 °C) 99 %      MAP       --         Physical Exam    Nursing note and vitals reviewed.  Constitutional: She appears well-developed and well-nourished. She is not diaphoretic. No distress.   HENT:   Head: Normocephalic and atraumatic.   Right Ear: External ear normal.   Left Ear: External ear normal.   Eyes: Conjunctivae and EOM are normal.   Neck: Normal range of motion.   Cardiovascular: Normal rate, regular rhythm, normal heart sounds and intact distal pulses. Exam reveals no gallop and no friction rub.    No murmur heard.  Bilateral pitting edema.    Pulmonary/Chest: Breath sounds normal. No respiratory distress. She has no wheezes. She has no rhonchi. She has no rales.   Abdominal: Soft. Bowel sounds are normal.   Musculoskeletal: Normal range of motion. She exhibits tenderness.        Right knee: She exhibits swelling. She exhibits normal range of motion, no effusion, no ecchymosis, no deformity, no laceration, no erythema, normal alignment, no LCL laxity, normal patellar mobility, no bony tenderness, normal meniscus and no MCL laxity. Tenderness found.        Legs:  Neurological: She is alert and oriented to person, place, and time. No cranial nerve deficit or sensory deficit. GCS score is 15. GCS eye subscore is 4. GCS verbal subscore is 5. GCS motor subscore is 6.   Skin: Skin is warm and dry. Capillary refill takes less than 2 seconds.   Psychiatric: She has a normal mood and affect. Her behavior is normal.         ED Course   Procedures  Labs Reviewed - No data to display       Imaging Results          X-Ray Knee 3 View Right (Final result)  Result time 08/15/19 22:08:10    Final result by Óscar Craven MD (08/15/19 22:08:10)                 Impression:      See above.      Electronically signed by: Óscar Craven  MD  Date:    08/15/2019  Time:    22:08             Narrative:    EXAMINATION:  XR KNEE 3 VIEW RIGHT    CLINICAL HISTORY:  Pain in right knee    TECHNIQUE:  AP, lateral, and Merchant views of the right knee were performed.    COMPARISON:  None    FINDINGS:  There is irregularity seen along the posterior cortex of the proximal fibular head lateral projection.  Uncertain if this represents chronic change.  Potential nondisplaced fracture is difficult to entirely exclude although this does not typical appearance of a fracture.  Correlate with trauma and point tenderness in this region.  Otherwise no evidence of acute displaced fracture or dislocation seen.  There is suspected suprapatellar joint effusion.  Patellar spurring is seen.  Joint spaces of the knee appear fairly well preserved.                                 Medical Decision Making:   Initial Assessment:   58 y.o female with anxiety and HTN presents to the ED with right knee pain after a trip and fall that occurred 1 week ago. No LOC or head injury. She states she landed directly on her knee. She came to the ED due to having bilateral foot swelling and right knee swelling. She denies eating any salty foods yesterday. Her pain is exacerbated with weight bearing activity.   Clinical Tests:   Radiological Study: Ordered and Reviewed  ED Management:  X-ray of right knee reveals no definite acute fracture.  Patient was given instructions to follow up with her PCP within the next 2 days for re-evaluation and to determine whether not orthopedic surgical followup is needed.  Prescription for Mobic was given patient was advised to return to the emergency department if condition worsens            Scribe Attestation:   Scribe #1: I performed the above scribed service and the documentation accurately describes the services I performed. I attest to the accuracy of the note.    This document was produced by a scribe under my direction and in my presence. I agree with the  content of the note and have made any necessary edits.     Dr. Copeland    08/16/2019 4:07 AM             Clinical Impression:     1. Contusion of right knee, initial encounter    2. Right knee pain            Disposition:   Disposition: Discharged  Condition: Stable                        Brian Copeland MD  08/16/19 0406

## 2020-10-31 ENCOUNTER — HOSPITAL ENCOUNTER (EMERGENCY)
Facility: HOSPITAL | Age: 60
Discharge: HOME | End: 2020-10-31
Attending: SURGERY
Payer: MEDICAID

## 2020-10-31 VITALS
OXYGEN SATURATION: 99 % | WEIGHT: 200.63 LBS | TEMPERATURE: 98 F | SYSTOLIC BLOOD PRESSURE: 162 MMHG | BODY MASS INDEX: 36.69 KG/M2 | HEART RATE: 72 BPM | RESPIRATION RATE: 20 BRPM | DIASTOLIC BLOOD PRESSURE: 74 MMHG
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
## 2020-10-31 DIAGNOSIS — S39.012A STRAIN OF LUMBAR REGION, INITIAL ENCOUNTER: ICD-10-CM

## 2020-10-31 DIAGNOSIS — S16.1XXA CERVICAL STRAIN, ACUTE, INITIAL ENCOUNTER: ICD-10-CM

## 2020-10-31 PROCEDURE — 99284 EMERGENCY DEPT VISIT MOD MDM: CPT | Mod: 25

## 2020-10-31 PROCEDURE — 25000003 PHARM REV CODE 250: Performed by: SURGERY

## 2020-10-31 RX ORDER — IBUPROFEN 800 MG/1
800 TABLET ORAL
Status: DISCONTINUED | OUTPATIENT
Start: 2020-10-31 | End: 2020-10-31 | Stop reason: HOSPADM

## 2020-10-31 RX ORDER — KETOROLAC TROMETHAMINE 10 MG/1
10 TABLET, FILM COATED ORAL EVERY 6 HOURS PRN
Qty: 15 TABLET | Refills: 0 | Status: SHIPPED | OUTPATIENT
Start: 2020-10-31

## 2020-10-31 RX ORDER — CYCLOBENZAPRINE HCL 10 MG
10 TABLET ORAL 3 TIMES DAILY PRN
Qty: 10 TABLET | Refills: 0 | Status: SHIPPED | OUTPATIENT
Start: 2020-10-31 | End: 2020-11-05

## 2020-10-31 RX ORDER — ACETAMINOPHEN 500 MG
1000 TABLET ORAL
Status: COMPLETED | OUTPATIENT
Start: 2020-10-31 | End: 2020-10-31

## 2020-10-31 RX ADMIN — ACETAMINOPHEN 1000 MG: 500 TABLET ORAL at 03:10
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
